# Patient Record
Sex: FEMALE | Race: WHITE | URBAN - METROPOLITAN AREA
[De-identification: names, ages, dates, MRNs, and addresses within clinical notes are randomized per-mention and may not be internally consistent; named-entity substitution may affect disease eponyms.]

---
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
## 2025-05-15 NOTE — PROCEDURE: MEDICATION COUNSELING
due     Lab Results   Component Value Date    LDLCALC 103 04/01/2024     Lab Results   Component Value Date    WBC 11.2 (H) 04/08/2025    HGB 14.7 04/08/2025    HCT 43.1 04/08/2025    MCV 91.9 04/08/2025     04/08/2025      Lab Results   Component Value Date     04/08/2025    K 3.8 04/08/2025     04/08/2025    CO2 25 04/08/2025    BUN 14 04/08/2025    CREATININE 0.79 04/08/2025    GLUCOSE 113 (H) 04/08/2025    CALCIUM 9.30 04/08/2025    BILITOT 0.8 04/08/2025    ALKPHOS 83 04/08/2025    AST 10 (L) 04/08/2025    ALT 5 (L) 04/08/2025    LABGLOM >60 09/26/2023    AGRATIO 1.4 (L) 04/08/2025        Lab Results   Component Value Date    CHOL 134 04/08/2025    TRIG 253 (H) 04/08/2025    HDL 31 (L) 04/08/2025     04/01/2024    VLDL 50 (H) 04/08/2025      Lab Results   Component Value Date    TSH 2.017 04/08/2025        Current Outpatient Medications   Medication Sig Dispense Refill    acetaZOLAMIDE (DIAMOX) 250 MG tablet Take 1 tablet by mouth daily as needed      carbidopa-levodopa (SINEMET)  MG per tablet Take 1 tablet by mouth nightly as needed      rosuvastatin (CRESTOR) 20 MG tablet Take 1 tablet by mouth daily      Tirzepatide (MOUNJARO) 10 MG/0.5ML SOAJ pen Inject 10 mg into the skin every 7 days 2 mL 2    dapagliflozin (FARXIGA) 10 MG tablet Take 1 tablet by mouth every morning 90 tablet 4    pantoprazole (PROTONIX) 40 MG tablet Take 1 tablet by mouth 2 times daily (before meals) 180 tablet 4    desvenlafaxine succinate (PRISTIQ) 25 MG TB24 extended release tablet Take 1 tablet by mouth daily 90 tablet 1    traZODone (DESYREL) 150 MG tablet Take 1 tablet by mouth every evening 90 tablet 1    Cholecalciferol (VITAMIN D3) 50 MCG (2000 UT) TABS TAKE 1 TABLET BY MOUTH DAILY 90 tablet 4    Continuous Blood Gluc  (FREESTYLE MICKI 14 DAY READER) PALOA Check blood sugars daily 3 each 3    Continuous Blood Gluc  (FREESTYLE MICKI 2 READER) PAOLA Check sugar levels daily 1 each 0  Acitretin Pregnancy And Lactation Text: This medication is Pregnancy Category X and should not be given to women who are pregnant or may become pregnant in the future. This medication is excreted in breast milk.